# Patient Record
Sex: MALE | Race: WHITE | NOT HISPANIC OR LATINO | Employment: FULL TIME | URBAN - NONMETROPOLITAN AREA
[De-identification: names, ages, dates, MRNs, and addresses within clinical notes are randomized per-mention and may not be internally consistent; named-entity substitution may affect disease eponyms.]

---

## 2024-07-15 PROBLEM — M25.572 CHRONIC PAIN OF LEFT ANKLE: Status: ACTIVE | Noted: 2024-07-15

## 2024-07-15 PROBLEM — J34.3 HYPERTROPHY OF INFERIOR NASAL TURBINATE: Status: ACTIVE | Noted: 2024-07-15

## 2024-07-15 PROBLEM — E78.2 MIXED HYPERLIPIDEMIA: Status: ACTIVE | Noted: 2024-07-15

## 2024-07-15 PROBLEM — F17.210 CIGARETTE NICOTINE DEPENDENCE: Status: ACTIVE | Noted: 2024-07-15

## 2024-07-15 PROBLEM — J30.9 ALLERGIC RHINITIS: Status: ACTIVE | Noted: 2024-07-15

## 2024-07-15 PROBLEM — J34.2 NOSE SEPTUM DEVIATION: Status: ACTIVE | Noted: 2024-07-15

## 2024-07-15 PROBLEM — M54.9 BACK PAIN: Status: ACTIVE | Noted: 2024-07-15

## 2024-07-15 PROBLEM — G89.29 CHRONIC PAIN OF LEFT ANKLE: Status: ACTIVE | Noted: 2024-07-15

## 2024-07-15 NOTE — PROGRESS NOTES
"Patient ID:   Howard Yung is a 44 y.o. male with PMH remarkable for HLD, chronic back pain, tobacco dependence who presents to the office today for Establish Care.    HEALTH MAINTENANCE: Establish Care  Previous PCP: Dr ANN MARIE Paula  Last Office Visit: 9/2022  Last Labs: 9/22/2022. Lipid last in 2018. DUE  Colonoscopy (45-75 or age 40 with 1st degree relative dx colon ca): DUE  - reports that he is feeling ok. Does not think he snores.  - has smoked ~25 yrs <1ppd. Has tried chantix in the past but had nightmares. Has quit for a couple months with the gum.  - unsure why he is on magnesium. He thinks it was due to cramping in legs after ankle surgery in 2022 with Dr Nayak.   - working full time,  for [employer].  - murmur and c/o TORRES, will get echocardiogram    Social History     Tobacco Use    Smoking status: Some Days     Current packs/day: 0.50     Average packs/day: 0.5 packs/day for 20.0 years (10.0 ttl pk-yrs)     Types: Cigarettes    Smokeless tobacco: Never   Substance Use Topics    Alcohol use: Yes     Alcohol/week: 2.0 standard drinks of alcohol     Types: 2 Cans of beer per week    Drug use: Never     Immunization History   Administered Date(s) Administered    Moderna SARS-CoV-2 Vaccination 04/22/2021, 05/20/2021    Tdap vaccine, age 7 year and older (BOOSTRIX, ADACEL) 10/28/2011, 09/25/2019     Review of Systems   All other systems reviewed and are negative.    Allergies   Allergen Reactions    Niacin Other, Hives, Itching and Swelling      Visit Vitals  /73   Pulse 63   Resp 18   Ht 1.803 m (5' 11\")   Wt 89.6 kg (197 lb 9.6 oz)   SpO2 97%   BMI 27.56 kg/m²   Smoking Status Some Days   BSA 2.12 m²     Physical Exam  Vitals reviewed.   Constitutional:       General: He is not in acute distress.     Appearance: Normal appearance. He is not ill-appearing.   HENT:      Head: Normocephalic and atraumatic.      Right Ear: Tympanic membrane and external ear normal.      Left Ear: Tympanic " membrane and external ear normal.      Nose: Nose normal.      Mouth/Throat:      Mouth: Mucous membranes are moist.      Pharynx: Oropharynx is clear.   Eyes:      Conjunctiva/sclera: Conjunctivae normal.      Pupils: Pupils are equal, round, and reactive to light.   Cardiovascular:      Rate and Rhythm: Normal rate and regular rhythm.      Heart sounds: Murmur heard.   Pulmonary:      Effort: Pulmonary effort is normal. No respiratory distress.      Breath sounds: Rhonchi present. No wheezing.   Abdominal:      General: There is no distension.      Palpations: Abdomen is soft. There is no mass.      Tenderness: There is no abdominal tenderness.   Musculoskeletal:         General: Normal range of motion.      Cervical back: Normal range of motion and neck supple.   Skin:     General: Skin is warm and dry.   Neurological:      General: No focal deficit present.      Mental Status: He is alert and oriented to person, place, and time.      Sensory: No sensory deficit.      Motor: No weakness.      Coordination: Coordination normal.      Gait: Gait normal.   Psychiatric:         Mood and Affect: Mood normal.         Behavior: Behavior normal.       Current Outpatient Medications   Medication Instructions    buPROPion XL (WELLBUTRIN XL) 300 mg, oral, Every morning, Do not crush, chew, or split.    magnesium oxide (MAG-OX) 400 mg, oral, Daily    multivitamin tablet 1 tablet, oral, Daily    NON FORMULARY Daily, D3 125 K2 100mg     Lab Results   Component Value Date    WBC 5.0 09/22/2022    HGB 15.6 09/22/2022    HCT 46.4 09/22/2022     09/22/2022    CHOL 210 (H) 02/08/2018    TRIG 61 02/08/2018    HDL 51.0 02/08/2018    ALT 11 09/22/2022    AST 15 09/22/2022     09/22/2022    K 3.9 09/22/2022     09/22/2022    CREATININE 0.79 09/22/2022    BUN 11 09/22/2022    CO2 29 09/22/2022    TSH 1.38 09/22/2022     Problem List Items Addressed This Visit             ICD-10-CM    Cigarette nicotine dependence  F17.210     - waxing and waning with the idea of quitting  - will discuss options for successful cessation         Relevant Medications    buPROPion XL (Wellbutrin XL) 300 mg 24 hr tablet    Encounter for medical examination to establish care Z00.00     - will check lab work         Relevant Orders    CBC and Auto Differential    Comprehensive Metabolic Panel    Lipid Panel    TSH with reflex to Free T4 if abnormal    Vitamin D 25-Hydroxy,Total (for eval of Vitamin D levels)    Vitamin B12    Colonoscopy Screening; Average Risk Patient    Cardiac murmur R01.1     - will get echocardioghram         Relevant Orders    Transthoracic Echo (TTE) Complete    Dyspnea on exertion R06.09    Relevant Orders    Transthoracic Echo (TTE) Complete     Other Visit Diagnoses         Codes    Colon cancer screening     Z12.11    Relevant Orders    Colonoscopy Screening; Average Risk Patient    Lipid screening     Z13.220    Relevant Orders    Lipid Panel          --------------------  Written by Molly Kelley RN, acting as a scribe for Dr. Torre. This note accurately reflects the work and decisions made by Dr. Torre.     I, Dr. Torre, attest all medical record entries made by the scribe were under my direction and were personally dictated by me. I have reviewed the chart and agree that the record accurately reflects my performance of the history, physical exam, and assessment and plan.

## 2024-07-16 ENCOUNTER — APPOINTMENT (OUTPATIENT)
Dept: PRIMARY CARE | Facility: CLINIC | Age: 44
End: 2024-07-16
Payer: COMMERCIAL

## 2024-07-16 VITALS
OXYGEN SATURATION: 97 % | RESPIRATION RATE: 18 BRPM | DIASTOLIC BLOOD PRESSURE: 73 MMHG | HEART RATE: 63 BPM | BODY MASS INDEX: 27.66 KG/M2 | WEIGHT: 197.6 LBS | SYSTOLIC BLOOD PRESSURE: 109 MMHG | HEIGHT: 71 IN

## 2024-07-16 DIAGNOSIS — Z13.220 LIPID SCREENING: ICD-10-CM

## 2024-07-16 DIAGNOSIS — Z12.11 COLON CANCER SCREENING: ICD-10-CM

## 2024-07-16 DIAGNOSIS — R01.1 CARDIAC MURMUR: ICD-10-CM

## 2024-07-16 DIAGNOSIS — F17.219 CIGARETTE NICOTINE DEPENDENCE WITH NICOTINE-INDUCED DISORDER: ICD-10-CM

## 2024-07-16 DIAGNOSIS — R06.09 DYSPNEA ON EXERTION: ICD-10-CM

## 2024-07-16 DIAGNOSIS — Z00.00 ENCOUNTER FOR MEDICAL EXAMINATION TO ESTABLISH CARE: ICD-10-CM

## 2024-07-16 PROCEDURE — 99204 OFFICE O/P NEW MOD 45 MIN: CPT | Performed by: INTERNAL MEDICINE

## 2024-07-16 PROCEDURE — 3008F BODY MASS INDEX DOCD: CPT | Performed by: INTERNAL MEDICINE

## 2024-07-16 RX ORDER — BISMUTH SUBSALICYLATE 262 MG
1 TABLET,CHEWABLE ORAL DAILY
COMMUNITY

## 2024-07-16 RX ORDER — BUPROPION HYDROCHLORIDE 300 MG/1
300 TABLET ORAL EVERY MORNING
Qty: 90 TABLET | Refills: 0 | Status: SHIPPED | OUTPATIENT
Start: 2024-07-16

## 2024-07-16 RX ORDER — CALCIUM CARBONATE 300MG(750)
400 TABLET,CHEWABLE ORAL DAILY
COMMUNITY

## 2024-07-16 ASSESSMENT — PATIENT HEALTH QUESTIONNAIRE - PHQ9
2. FEELING DOWN, DEPRESSED OR HOPELESS: NOT AT ALL
SUM OF ALL RESPONSES TO PHQ9 QUESTIONS 1 AND 2: 0
1. LITTLE INTEREST OR PLEASURE IN DOING THINGS: NOT AT ALL

## 2024-07-16 ASSESSMENT — PAIN SCALES - GENERAL: PAINLEVEL: 0-NO PAIN

## 2024-07-16 NOTE — PATIENT INSTRUCTIONS
It was nice to meet you in the office today!  As discussed during our visit...     Change the magnesium to a 250mg daily dose for cramping.  ------------------------------------------------------------------------------  Please have your blood drawn in the next 1-2 weeks.   You need to be FASTING for 12 hours prior to blood draw.  You may have BLACK coffee, BLACK tea and/or water at any time during the fasting time.  We will contact you with the results of your blood work and any necessary adjustments to your plan of care.  If you do not hear from us within 3-5 days of having your blood drawn, please call the [place] office at [phone].     The two closest Outpatient Lab's to this office is:  [address] [place] [address]    Hours:   Monday through Friday 7:30am - 4:30pm (Closed 12:30-1pm for lunch)     Or you can go to ...  [facility]  [address]  [place] [address] [phone]    Hours:   Monday through Friday 7:30am - 4:30pm (Closed 12:30-1pm for lunch)   Saturday 8am - 12pm    Website to confirm hours and location OR look up more locations ... [phone]

## 2024-07-19 ENCOUNTER — LAB (OUTPATIENT)
Dept: LAB | Facility: LAB | Age: 44
End: 2024-07-19
Payer: COMMERCIAL

## 2024-07-19 DIAGNOSIS — Z00.00 ENCOUNTER FOR MEDICAL EXAMINATION TO ESTABLISH CARE: ICD-10-CM

## 2024-07-19 DIAGNOSIS — Z13.220 LIPID SCREENING: ICD-10-CM

## 2024-07-19 LAB
25(OH)D3 SERPL-MCNC: 55 NG/ML (ref 30–100)
ALBUMIN SERPL BCP-MCNC: 4.2 G/DL (ref 3.4–5)
ALP SERPL-CCNC: 43 U/L (ref 33–120)
ALT SERPL W P-5'-P-CCNC: 10 U/L (ref 10–52)
ANION GAP SERPL CALC-SCNC: 13 MMOL/L (ref 10–20)
AST SERPL W P-5'-P-CCNC: 15 U/L (ref 9–39)
BASOPHILS # BLD AUTO: 0.05 X10*3/UL (ref 0–0.1)
BASOPHILS NFR BLD AUTO: 1 %
BILIRUB SERPL-MCNC: 0.5 MG/DL (ref 0–1.2)
BUN SERPL-MCNC: 13 MG/DL (ref 6–23)
CALCIUM SERPL-MCNC: 8.8 MG/DL (ref 8.6–10.3)
CHLORIDE SERPL-SCNC: 103 MMOL/L (ref 98–107)
CHOLEST SERPL-MCNC: 240 MG/DL (ref 0–199)
CHOLESTEROL/HDL RATIO: 5
CO2 SERPL-SCNC: 26 MMOL/L (ref 21–32)
CREAT SERPL-MCNC: 0.78 MG/DL (ref 0.5–1.3)
EGFRCR SERPLBLD CKD-EPI 2021: >90 ML/MIN/1.73M*2
EOSINOPHIL # BLD AUTO: 0.18 X10*3/UL (ref 0–0.7)
EOSINOPHIL NFR BLD AUTO: 3.7 %
ERYTHROCYTE [DISTWIDTH] IN BLOOD BY AUTOMATED COUNT: 12.8 % (ref 11.5–14.5)
GLUCOSE SERPL-MCNC: 109 MG/DL (ref 74–99)
HCT VFR BLD AUTO: 45.4 % (ref 41–52)
HDLC SERPL-MCNC: 48.3 MG/DL
HGB BLD-MCNC: 15.1 G/DL (ref 13.5–17.5)
IMM GRANULOCYTES # BLD AUTO: 0.01 X10*3/UL (ref 0–0.7)
IMM GRANULOCYTES NFR BLD AUTO: 0.2 % (ref 0–0.9)
LDLC SERPL CALC-MCNC: 165 MG/DL
LYMPHOCYTES # BLD AUTO: 2.04 X10*3/UL (ref 1.2–4.8)
LYMPHOCYTES NFR BLD AUTO: 41.9 %
MCH RBC QN AUTO: 31.6 PG (ref 26–34)
MCHC RBC AUTO-ENTMCNC: 33.3 G/DL (ref 32–36)
MCV RBC AUTO: 95 FL (ref 80–100)
MONOCYTES # BLD AUTO: 0.57 X10*3/UL (ref 0.1–1)
MONOCYTES NFR BLD AUTO: 11.7 %
NEUTROPHILS # BLD AUTO: 2.02 X10*3/UL (ref 1.2–7.7)
NEUTROPHILS NFR BLD AUTO: 41.5 %
NON HDL CHOLESTEROL: 192 MG/DL (ref 0–149)
NRBC BLD-RTO: 0 /100 WBCS (ref 0–0)
PLATELET # BLD AUTO: 237 X10*3/UL (ref 150–450)
POTASSIUM SERPL-SCNC: 4.1 MMOL/L (ref 3.5–5.3)
PROT SERPL-MCNC: 6.5 G/DL (ref 6.4–8.2)
RBC # BLD AUTO: 4.78 X10*6/UL (ref 4.5–5.9)
SODIUM SERPL-SCNC: 138 MMOL/L (ref 136–145)
TRIGL SERPL-MCNC: 132 MG/DL (ref 0–149)
TSH SERPL-ACNC: 1.89 MIU/L (ref 0.44–3.98)
VIT B12 SERPL-MCNC: 330 PG/ML (ref 211–911)
VLDL: 26 MG/DL (ref 0–40)
WBC # BLD AUTO: 4.9 X10*3/UL (ref 4.4–11.3)

## 2024-07-19 PROCEDURE — 82607 VITAMIN B-12: CPT

## 2024-07-19 PROCEDURE — 36415 COLL VENOUS BLD VENIPUNCTURE: CPT

## 2024-07-19 PROCEDURE — 82306 VITAMIN D 25 HYDROXY: CPT

## 2024-07-22 DIAGNOSIS — E78.5 HYPERLIPIDEMIA, UNSPECIFIED HYPERLIPIDEMIA TYPE: ICD-10-CM

## 2024-07-23 ENCOUNTER — APPOINTMENT (OUTPATIENT)
Dept: CARDIOLOGY | Facility: HOSPITAL | Age: 44
End: 2024-07-23
Payer: COMMERCIAL

## 2024-07-23 RX ORDER — ATORVASTATIN CALCIUM 20 MG/1
20 TABLET, FILM COATED ORAL DAILY
Qty: 100 TABLET | Refills: 0 | Status: SHIPPED | OUTPATIENT
Start: 2024-07-23 | End: 2025-08-27

## 2024-07-24 ENCOUNTER — HOSPITAL ENCOUNTER (OUTPATIENT)
Dept: CARDIOLOGY | Facility: HOSPITAL | Age: 44
Discharge: HOME | End: 2024-07-24
Payer: COMMERCIAL

## 2024-07-24 DIAGNOSIS — R01.1 CARDIAC MURMUR: ICD-10-CM

## 2024-07-24 DIAGNOSIS — R06.09 DYSPNEA ON EXERTION: ICD-10-CM

## 2024-07-24 PROCEDURE — 93306 TTE W/DOPPLER COMPLETE: CPT | Performed by: STUDENT IN AN ORGANIZED HEALTH CARE EDUCATION/TRAINING PROGRAM

## 2024-07-24 PROCEDURE — 93306 TTE W/DOPPLER COMPLETE: CPT

## 2024-07-26 LAB
AORTIC VALVE PEAK VELOCITY: 1.2 M/S
AV PEAK GRADIENT: 5.8 MMHG
AVA (PEAK VEL): 2.67 CM2
EJECTION FRACTION APICAL 4 CHAMBER: 59.7
EJECTION FRACTION: 59 %
LEFT ATRIUM VOLUME AREA LENGTH INDEX BSA: 28.2 ML/M2
LEFT VENTRICLE INTERNAL DIMENSION DIASTOLE: 5.01 CM (ref 3.5–6)
LEFT VENTRICULAR OUTFLOW TRACT DIAMETER: 2.1 CM
MITRAL VALVE E/A RATIO: 0.73
RIGHT VENTRICLE FREE WALL PEAK S': 13.7 CM/S

## 2024-08-23 ENCOUNTER — TELEPHONE (OUTPATIENT)
Dept: GASTROENTEROLOGY | Facility: CLINIC | Age: 44
End: 2024-08-23
Payer: COMMERCIAL

## 2024-08-23 NOTE — TELEPHONE ENCOUNTER
LM for patient to call, need to reschedule appointment on 10/16 - Dr. Horta will not be in that day.

## 2024-08-26 ENCOUNTER — TELEPHONE (OUTPATIENT)
Dept: GASTROENTEROLOGY | Facility: CLINIC | Age: 44
End: 2024-08-26
Payer: COMMERCIAL

## 2024-09-25 ENCOUNTER — APPOINTMENT (OUTPATIENT)
Dept: PREADMISSION TESTING | Facility: HOSPITAL | Age: 44
End: 2024-09-25
Payer: COMMERCIAL

## 2024-10-15 ENCOUNTER — ANESTHESIA EVENT (OUTPATIENT)
Dept: GASTROENTEROLOGY | Facility: HOSPITAL | Age: 44
End: 2024-10-15
Payer: COMMERCIAL

## 2024-10-15 NOTE — ANESTHESIA PREPROCEDURE EVALUATION
Patient: Howard Yung    Procedure Information       Date/Time: 11/06/24 1000    Scheduled providers: Do Horta MD    Procedure: COLONOSCOPY    Location: [facility]            Relevant Problems   Anesthesia (within normal limits)      Cardiac   (+) Cardiac murmur   (+) Mixed hyperlipidemia      Pulmonary   (+) Dyspnea on exertion      Neuro (within normal limits)      GI (within normal limits)      /Renal (within normal limits)      Liver (within normal limits)      Endocrine (within normal limits)      Hematology (within normal limits)      Musculoskeletal (within normal limits)      HEENT (within normal limits)      ID (within normal limits)      Skin (within normal limits)      GYN (within normal limits)      ENT   (+) Allergic rhinitis      Tobacco   (+) Cigarette nicotine dependence     There were no vitals filed for this visit.    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    Personal history of diseases of the skin and subcutaneous tissue 10/28/2014    History of dermatitis    Personal history of nicotine dependence 11/25/2014    History of nicotine dependence    Personal history of other diseases of the circulatory system 07/23/2013    History of orthostatic hypotension    Personal history of other diseases of the nervous system and sense organs 09/16/2013    History of cluster headache    Personal history of other diseases of the respiratory system 03/06/2018    History of sinusitis    Personal history of other endocrine, nutritional and metabolic disease 11/25/2014    History of hypercholesterolemia    Personal history of  other infectious and parasitic diseases 02/18/2016    History of viral infection       Current Outpatient Medications:     atorvastatin (Lipitor) 20 mg tablet, Take 1 tablet (20 mg) by mouth once daily., Disp: 100 tablet, Rfl: 0    buPROPion XL (Wellbutrin XL) 300 mg 24 hr tablet, Take 1 tablet (300 mg) by mouth once daily in the morning. Do not crush, chew, or split., Disp: 90 tablet, Rfl: 0    magnesium oxide (Mag-Ox) 400 mg tablet, Take 1 tablet (400 mg) by mouth once daily., Disp: , Rfl:     multivitamin tablet, Take 1 tablet by mouth once daily., Disp: , Rfl:     NON FORMULARY, once daily. D3 125 K2 100mg, Disp: , Rfl:   Prior to Admission medications    Medication Sig Start Date End Date Taking? Authorizing Provider   atorvastatin (Lipitor) 20 mg tablet Take 1 tablet (20 mg) by mouth once daily. 7/23/24 8/27/25  Miguelito Nicolas MD   buPROPion XL (Wellbutrin XL) 300 mg 24 hr tablet Take 1 tablet (300 mg) by mouth once daily in the morning. Do not crush, chew, or split. 7/16/24   Miguelito Nicolas MD   magnesium oxide (Mag-Ox) 400 mg tablet Take 1 tablet (400 mg) by mouth once daily.    Historical Provider, MD   multivitamin tablet Take 1 tablet by mouth once daily.    Historical Provider, MD   NON FORMULARY once daily. D3 125 K2 100mg    Historical Provider, MD     Allergies   Allergen Reactions    Niacin Other, Hives, Itching and Swelling     Social History     Tobacco Use    Smoking status: Some Days     Current packs/day: 0.50     Average packs/day: 0.5 packs/day for 20.0 years (10.0 ttl pk-yrs)     Types: Cigarettes    Smokeless tobacco: Never   Substance Use Topics    Alcohol use: Yes     Alcohol/week: 2.0 standard drinks of alcohol     Types: 2 Cans of beer per week         Chemistry    Lab Results   Component Value Date/Time     07/19/2024 0726    K 4.1 07/19/2024 0726     07/19/2024 0726    CO2 26 07/19/2024 0726    BUN 13 07/19/2024 0726    CREATININE 0.78 07/19/2024 0726    Lab Results  "  Component Value Date/Time    CALCIUM 8.8 07/19/2024 0726    ALKPHOS 43 07/19/2024 0726    AST 15 07/19/2024 0726    ALT 10 07/19/2024 0726    BILITOT 0.5 07/19/2024 0726          Lab Results   Component Value Date/Time    WBC 4.9 07/19/2024 0726    HGB 15.1 07/19/2024 0726    HCT 45.4 07/19/2024 0726     07/19/2024 0726     No results found for: \"PROTIME\", \"PTT\", \"INR\"  No results found for this or any previous visit (from the past 4464 hour(s)).  No results found for this or any previous visit from the past 1095 days.   Clinical information reviewed:                 Chart reviewed.  No clearances ordered.    Echo 7/24/24-   CONCLUSIONS:   1. The left ventricular systolic function is normal, with a Patterson's biplane calculated ejection fraction of 59%.   2. Spectral Doppler shows an impaired relaxation pattern of left ventricular diastolic filling.   3. There is normal right ventricular global systolic function.    NPO Detail:  No data recorded     Physical Exam    Airway  Mallampati: I  TM distance: >3 FB  Neck ROM: full     Cardiovascular   Rhythm: regular  Rate: normal     Dental    Pulmonary    Abdominal            Anesthesia Plan    History of general anesthesia?: yes  History of complications of general anesthesia?: no    ASA 3     MAC     The patient is a current smoker.  Patient was previously instructed to abstain from smoking on day of procedure.  Patient did not smoke on day of procedure.    intravenous induction   Anesthetic plan and risks discussed with patient.    Plan discussed with CRNA.      "

## 2024-10-16 ENCOUNTER — APPOINTMENT (OUTPATIENT)
Dept: GASTROENTEROLOGY | Facility: HOSPITAL | Age: 44
End: 2024-10-16
Payer: COMMERCIAL

## 2024-10-16 ENCOUNTER — PRE-ADMISSION TESTING (OUTPATIENT)
Dept: PREADMISSION TESTING | Facility: HOSPITAL | Age: 44
End: 2024-10-16
Payer: COMMERCIAL

## 2024-10-16 ASSESSMENT — DUKE ACTIVITY SCORE INDEX (DASI)
CAN YOU DO LIGHT WORK AROUND THE HOUSE LIKE DUSTING OR WASHING DISHES: YES
CAN YOU PARTICIPATE IN STRENOUS SPORTS LIKE SWIMMING, SINGLES TENNIS, FOOTBALL, BASKETBALL, OR SKIING: YES
CAN YOU TAKE CARE OF YOURSELF (EAT, DRESS, BATHE, OR USE TOILET): YES
CAN YOU WALK A BLOCK OR TWO ON LEVEL GROUND: YES
CAN YOU PARTICIPATE IN MODERATE RECREATIONAL ACTIVITIES LIKE GOLF, BOWLING, DANCING, DOUBLES TENNIS OR THROWING A BASEBALL OR FOOTBALL: YES
DASI METS SCORE: 9.9
CAN YOU CLIMB A FLIGHT OF STAIRS OR WALK UP A HILL: YES
CAN YOU DO YARD WORK LIKE RAKING LEAVES, WEEDING OR PUSHING A MOWER: YES
CAN YOU DO MODERATE WORK AROUND THE HOUSE LIKE VACUUMING, SWEEPING FLOORS OR CARRYING GROCERIES: YES
TOTAL_SCORE: 58.2
CAN YOU RUN A SHORT DISTANCE: YES
CAN YOU HAVE SEXUAL RELATIONS: YES
CAN YOU DO HEAVY WORK AROUND THE HOUSE LIKE SCRUBBING FLOORS OR LIFTING AND MOVING HEAVY FURNITURE: YES
CAN YOU WALK INDOORS, SUCH AS AROUND YOUR HOUSE: YES

## 2024-10-16 NOTE — PREPROCEDURE INSTRUCTIONS
Medication List            Accurate as of October 16, 2024  1:00 PM. Always use your most recent med list.                magnesium oxide 400 mg tablet  Commonly known as: Mag-Ox  Additional Medication Adjustments for Surgery: Take last dose 7 days before surgery     multivitamin tablet  Additional Medication Adjustments for Surgery: Take last dose 7 days before surgery     NON FORMULARY  Additional Medication Adjustments for Surgery: Take last dose 7 days before surgery                 Call outpatient surgery the day before between 1-3 pm to get your arrival time.   [phone]    No food the day before or day of procedure, only clear liquids. Stay hydrated.     Follow prep instructions.     You can have clear liquids up to 3 hrs prior to your procedure.  NO DAIRY, NO CREAMER, NO NON DAIRY OR ALMOND, OAT, COCONUT ETC.    Please refrain from smoking THC, cigarettes or vaping prior to your procedure at least 24 hrs.     When  you arrive park in the back ER parking lot.  Come through the ER lobby and take the first elevator to second floor.   Check in on the outpatient surgery window.    Leave all valuables at home and remove all piercing's.      NO driving for 24 hrs if you have had anesthesia or sedation.   You will also need a  if you are receiving sedation.       Bring glasses so you can read what you are signing.

## 2024-11-06 ENCOUNTER — HOSPITAL ENCOUNTER (OUTPATIENT)
Dept: GASTROENTEROLOGY | Facility: HOSPITAL | Age: 44
Setting detail: OUTPATIENT SURGERY
Discharge: HOME | End: 2024-11-06
Payer: COMMERCIAL

## 2024-11-06 ENCOUNTER — ANESTHESIA (OUTPATIENT)
Dept: GASTROENTEROLOGY | Facility: HOSPITAL | Age: 44
End: 2024-11-06
Payer: COMMERCIAL

## 2024-11-06 VITALS
HEIGHT: 71 IN | SYSTOLIC BLOOD PRESSURE: 96 MMHG | OXYGEN SATURATION: 97 % | HEART RATE: 64 BPM | WEIGHT: 197 LBS | RESPIRATION RATE: 16 BRPM | DIASTOLIC BLOOD PRESSURE: 65 MMHG | BODY MASS INDEX: 27.58 KG/M2 | TEMPERATURE: 97.3 F

## 2024-11-06 DIAGNOSIS — Z12.11 COLON CANCER SCREENING: ICD-10-CM

## 2024-11-06 DIAGNOSIS — Z00.00 ENCOUNTER FOR MEDICAL EXAMINATION TO ESTABLISH CARE: ICD-10-CM

## 2024-11-06 PROCEDURE — 3700000002 HC GENERAL ANESTHESIA TIME - EACH INCREMENTAL 1 MINUTE

## 2024-11-06 PROCEDURE — 2500000005 HC RX 250 GENERAL PHARMACY W/O HCPCS

## 2024-11-06 PROCEDURE — 2500000004 HC RX 250 GENERAL PHARMACY W/ HCPCS (ALT 636 FOR OP/ED): Performed by: NURSE ANESTHETIST, CERTIFIED REGISTERED

## 2024-11-06 PROCEDURE — 7100000009 HC PHASE TWO TIME - INITIAL BASE CHARGE

## 2024-11-06 PROCEDURE — 3700000001 HC GENERAL ANESTHESIA TIME - INITIAL BASE CHARGE

## 2024-11-06 PROCEDURE — 45385 COLONOSCOPY W/LESION REMOVAL: CPT | Performed by: SURGERY

## 2024-11-06 PROCEDURE — 7100000010 HC PHASE TWO TIME - EACH INCREMENTAL 1 MINUTE

## 2024-11-06 RX ORDER — FENTANYL CITRATE 50 UG/ML
INJECTION, SOLUTION INTRAMUSCULAR; INTRAVENOUS AS NEEDED
Status: DISCONTINUED | OUTPATIENT
Start: 2024-11-06 | End: 2024-11-06

## 2024-11-06 RX ORDER — PROPOFOL 10 MG/ML
INJECTION, EMULSION INTRAVENOUS AS NEEDED
Status: DISCONTINUED | OUTPATIENT
Start: 2024-11-06 | End: 2024-11-06

## 2024-11-06 SDOH — HEALTH STABILITY: MENTAL HEALTH: CURRENT SMOKER: 1

## 2024-11-06 ASSESSMENT — PAIN SCALES - GENERAL
PAINLEVEL_OUTOF10: 0 - NO PAIN
PAIN_LEVEL: 0

## 2024-11-06 ASSESSMENT — ENCOUNTER SYMPTOMS
CHILLS: 0
WOUND: 0
ABDOMINAL PAIN: 0
BLOOD IN STOOL: 0
FEVER: 0
SHORTNESS OF BREATH: 0
MUSCULOSKELETAL NEGATIVE: 1
NEUROLOGICAL NEGATIVE: 1
FATIGUE: 0
DIARRHEA: 0
RECTAL PAIN: 0
VOMITING: 0
DIAPHORESIS: 0
CONSTIPATION: 0
NAUSEA: 0
ABDOMINAL DISTENTION: 0
ANAL BLEEDING: 0

## 2024-11-06 ASSESSMENT — PAIN - FUNCTIONAL ASSESSMENT
PAIN_FUNCTIONAL_ASSESSMENT: 0-10

## 2024-11-06 ASSESSMENT — COLUMBIA-SUICIDE SEVERITY RATING SCALE - C-SSRS
1. IN THE PAST MONTH, HAVE YOU WISHED YOU WERE DEAD OR WISHED YOU COULD GO TO SLEEP AND NOT WAKE UP?: NO
2. HAVE YOU ACTUALLY HAD ANY THOUGHTS OF KILLING YOURSELF?: NO
6. HAVE YOU EVER DONE ANYTHING, STARTED TO DO ANYTHING, OR PREPARED TO DO ANYTHING TO END YOUR LIFE?: NO

## 2024-11-06 NOTE — DISCHARGE INSTRUCTIONS
Patient Instructions after a Colonoscopy      The anesthetics, sedatives or narcotics which were given to you today will be acting in your body for the next 24 hours, so you might feel a little sleepy or groggy.  This feeling should slowly wear off. Carefully read and follow the instructions.     You received sedation today:  - Do not drive or operate any machinery or power tools of any kind.   - No alcoholic beverages today, not even beer or wine.  - Do not make any important decisions or sign any legal documents.  - No over the counter medications that contain alcohol or that may cause drowsiness.  - Do not make any important decisions or sign any legal documents.  - Make sure you have someone with you for first 24 hours.    While it is common to experience mild to moderate abdominal distention, gas, or belching after your procedure, if any of these symptoms occur following discharge from the GI Lab or within one week of having your procedure, call the [facility] to be advised whether a visit to your nearest Urgent Care or Emergency Department is indicated.  Take this paper with you if you go.     - If you develop an allergic reaction to the medications that were given during your procedure such as difficulty breathing, rash, hives, severe nausea, vomiting or lightheadedness.  - If you experience chest pain, shortness of breath, severe abdominal pain, fevers and chills.  -If you develop signs and symptoms of bleeding such as blood in your spit, if your stools turn black, tarry, or bloody  - If you have not urinated within 8 hours following your procedure.  - If your IV site becomes painful, red, inflamed, or looks infected.    If you received a biopsy/polypectomy/sphincterotomy the following instructions apply below:    __ Do not use Aspirin containing products, non-steroidal medications or anti-coagulants for one week following your procedure. (Examples of these types of medications are: Advil,  Arthrotec, Aleve, Coumadin, Ecotrin, Heparin, Ibuprofen, Indocin, Motrin, Naprosyn, Nuprin, Plavix, Vioxx, and Voltarin, or their generic forms.  This list is not all-inclusive.  Check with your physician or pharmacist before resuming medications.)   __ Eat a soft diet today.  Avoid foods that are poorly digested for the next 24 hours.  These foods would include: nuts, beans, lettuce, red meats, and fried foods. Start with liquids and advance your diet as tolerated, gradually work up to eating solids.   __ Do not have a Barium Study or Enema for one week.    Your physician recommends the additional following instructions:    -You have a contact number available for emergencies. The signs and symptoms of potential delayed complications were discussed with you. You may return to normal activities tomorrow.  -Resume your previous diet.  -Continue your present medications.   -We are waiting for your pathology results.  -Your physician has recommended a repeat colonoscopy (date to be determined after pending pathology results are reviewed) for surveillance based on pathology results.  -The findings and recommendations have been discussed with you.  -The findings and recommendations were discussed with your family.  - Please see Medication Reconciliation Form for new medication/medications prescribed.       If you experience any problems or have any questions following discharge from the GI Lab, please call:        Nurse Signature                                                                        Date___________________                                                                            Patient/Responsible Party Signature                                        Date___________________

## 2024-11-06 NOTE — ANESTHESIA POSTPROCEDURE EVALUATION
Patient: Howard Yung    Procedure Summary       Date: 11/06/24 Room / Location: [facility]    Anesthesia Start: 0821 Anesthesia Stop: 0854    Procedure: COLONOSCOPY Diagnosis:       Encounter for medical examination to establish care      Colon cancer screening    Scheduled Providers: Do Horta MD Responsible Provider: TRUONG Wright    Anesthesia Type: MAC ASA Status: 3            Anesthesia Type: MAC    Vitals Value Taken Time   BP 88/61 11/06/24 0853   Temp 36.3 °C (97.3 °F) 11/06/24 0853   Pulse 65 11/06/24 0853   Resp 14 11/06/24 0853   SpO2 97 % 11/06/24 0853       Anesthesia Post Evaluation    Patient location during evaluation: bedside  Patient participation: waiting for patient participation  Level of consciousness: awake and sedated  Pain score: 0  Pain management: adequate  Multimodal analgesia pain management approach  Airway patency: patent  Two or more strategies used to mitigate risk of obstructive sleep apnea  Cardiovascular status: acceptable  Respiratory status: acceptable  Hydration status: acceptable  Postoperative Nausea and Vomiting: none        No notable events documented.

## 2024-11-06 NOTE — H&P
History Of Present Illness  Howard Yung is a 44 y.o. male presenting with colon cancer screening. Here for colonoscopy. Referred by his PCP, Dr. Torre. This is his first ever. Bowels are regular. No BRBPR. No family history of colon cancer. No abdominal or pelvic surgeries. He is not a smoker. Denies fever, chills, SOB, CP, n/v/d.     States stool is liquid but not clear. Agreed to fleets enema.      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    Personal history of diseases of the skin and subcutaneous tissue 10/28/2014    History of dermatitis    Personal history of nicotine dependence 11/25/2014    History of nicotine dependence    Personal history of other diseases of the circulatory system 07/23/2013    History of orthostatic hypotension    Personal history of other diseases of the nervous system and sense organs 09/16/2013    History of cluster headache    Personal history of other diseases of the respiratory system 03/06/2018    History of sinusitis    Personal history of other endocrine, nutritional and metabolic disease 11/25/2014    History of hypercholesterolemia    Personal history of other infectious and parasitic diseases 02/18/2016    History of viral infection       Surgical History  Past Surgical History:   Procedure Laterality Date    FRACTURE SURGERY Left 2022    ankle    KNEE SURGERY Right 07/23/2013    Knee Surgery    OTHER SURGICAL HISTORY  09/22/2022    Vasectomy        Social History  He reports that he quit smoking about 20 years ago. His smoking use included cigarettes. He has a 10 pack-year smoking history. He has never used smokeless tobacco. He reports current alcohol use of about 2.0 standard drinks of alcohol per week. He reports that he does not use drugs.    Family History  Family  History   Problem Relation Name Age of Onset    Heart disease Maternal Grandmother          Allergies  Niacin    Current Outpatient Medications on File Prior to Encounter   Medication Sig Dispense Refill    magnesium oxide (Mag-Ox) 400 mg tablet Take 1 tablet (400 mg) by mouth once daily.      multivitamin tablet Take 1 tablet by mouth once daily.      NON FORMULARY once daily. D3 125 K2 100mg       No current facility-administered medications on file prior to encounter.       Review of Systems   Constitutional:  Negative for chills, diaphoresis, fatigue and fever.   HENT: Negative.     Respiratory:  Negative for shortness of breath.    Cardiovascular:  Negative for chest pain.   Gastrointestinal:  Negative for abdominal distention, abdominal pain, anal bleeding, blood in stool, constipation, diarrhea, nausea, rectal pain and vomiting.   Genitourinary: Negative.    Musculoskeletal: Negative.    Skin:  Negative for pallor, rash and wound.   Neurological: Negative.         Physical Exam  Constitutional:       General: He is not in acute distress.     Appearance: Normal appearance.   HENT:      Head: Normocephalic.      Mouth/Throat:      Mouth: Mucous membranes are moist.   Eyes:      General: No scleral icterus.     Conjunctiva/sclera: Conjunctivae normal.      Pupils: Pupils are equal, round, and reactive to light.   Cardiovascular:      Rate and Rhythm: Normal rate and regular rhythm.      Pulses: Normal pulses.      Heart sounds: Normal heart sounds.   Pulmonary:      Effort: Pulmonary effort is normal. No respiratory distress.      Breath sounds: Normal breath sounds.   Abdominal:      General: Abdomen is flat. Bowel sounds are normal. There is no distension.      Palpations: Abdomen is soft. There is no mass.      Tenderness: There is no abdominal tenderness. There is no guarding or rebound.   Musculoskeletal:         General: Normal range of motion.   Skin:     General: Skin is warm and dry.   Neurological:      " General: No focal deficit present.      Mental Status: He is alert and oriented to person, place, and time. Mental status is at baseline.   Psychiatric:         Mood and Affect: Mood normal.          Last Recorded Vitals  Blood pressure 109/80, pulse 62, temperature 36.2 °C (97.2 °F), temperature source Temporal, resp. rate 17, height 1.803 m (5' 11\"), weight 89.4 kg (197 lb), SpO2 98%.       Assessment/Plan   Assessment & Plan  Colon cancer screening      Colonoscopy, fleets enema beforehand       I spent 25 minutes in the professional and overall care of this patient.      Corina Manzano PA-C    "

## 2024-11-07 ASSESSMENT — PAIN SCALES - GENERAL: PAINLEVEL_OUTOF10: 0 - NO PAIN

## 2024-11-13 LAB
LABORATORY COMMENT REPORT: NORMAL
PATH REPORT.FINAL DX SPEC: NORMAL
PATH REPORT.GROSS SPEC: NORMAL
PATH REPORT.RELEVANT HX SPEC: NORMAL
PATH REPORT.TOTAL CANCER: NORMAL

## 2025-05-20 ENCOUNTER — APPOINTMENT (OUTPATIENT)
Dept: OTOLARYNGOLOGY | Facility: CLINIC | Age: 45
End: 2025-05-20
Payer: COMMERCIAL

## 2025-05-20 DIAGNOSIS — J34.2 DEVIATED NASAL SEPTUM: ICD-10-CM

## 2025-05-20 DIAGNOSIS — J30.9 ALLERGIC RHINITIS, UNSPECIFIED SEASONALITY, UNSPECIFIED TRIGGER: Primary | ICD-10-CM

## 2025-05-20 RX ORDER — AZELASTINE 1 MG/ML
2 SPRAY, METERED NASAL 2 TIMES DAILY
Qty: 30 ML | Refills: 0 | Status: SHIPPED | OUTPATIENT
Start: 2025-05-20 | End: 2026-05-20

## 2025-05-20 RX ORDER — IPRATROPIUM BROMIDE 42 UG/1
2 SPRAY, METERED NASAL 2 TIMES DAILY
Qty: 30 ML | Refills: 0 | Status: SHIPPED | OUTPATIENT
Start: 2025-05-20 | End: 2025-06-19

## 2025-05-20 NOTE — PROGRESS NOTES
05.20.2025.  ______________________________________________________________________       Patient Discussion/Summary     Patient has a deviated nasal septum to right, left inferior turbinate hypertrophy.  He has frequent sneezing, and clear nasal discharge.  He is a smoker.     Plan:  1- Allergy test  2- RF treatment to inferior turbinates vs septoplasty-turbinoplasty recommended      Chief Complaint     sinus issues      History of Present Illness  Mr. Yung is a 40 yo M. He has chronic nose sinus problems for more than 2 years. HE has nasal congestion stuffiness in the morning and in the evening. He has sinus pressure, nasal discharge (clear, milky), postnasal drip and snoring. He was given sinusitis treatment by his pcp, but could not find relief.      He was not tested for allergies recently.  He has tried oral steroids years ago, he temporarily felt good at that time.        Review of Systems     Constitutional: no fever, not feeling tired, no recent weight gain and no recent weight loss.   Eyes: no eye pain, no double vision and no itching of the eyes.   ENMT: rhinorrhea, sinus pressure, nasal blockage/obstruction, snoring and postnasal drip, but no difficulty with hearing, no hearing loss, no pain in the ear, no vertigo, no tinnitus, the ears do not feel full, no discharge from the ears, no nosebleeds, no sore throat, no hoarseness, the gums were not bleeding, no dry mouth, no dysphagia and no mouth ulcers.   Cardiovascular: no history of murmur, no chest pain, no palpitations and no lower extremity edema.   Respiratory: no shortness of breath, no dyspnea during exertion, no wheezing, not coughing up sputum and not coughing up blood.   Gastrointestinal: no heartburn, no vomiting, no change in appetite and no pain on swallowing.   Genitourinary: no dysuria and no difficulty urinating.   Musculoskeletal: no arthralgias and no myalgias.   Integumentary: no rashes, no skin lesions, no itching, no dry skin and no  unusual growth on the skin.   Neurological: no fainting, no headache, no numbness, no convulsions and no weakness.   Psychiatric: no anxiety and no depression.   Endocrine: no increased thirst.   Hematologic/Lymphatic: no swollen glands, no tendency for easy bleeding and no tendency for easy bruising.   All other systems have been reviewed and are negative for complaint.          Physical Exam  General appearance: Healthy-appearing, well-nourished, well groomed, in no acute distress.      Head and Face: Atraumatic with no masses, lesions, or scarring.      Salivary glands: No tenderness of the parotid glands or parotid masses.      No tenderness of the submandibular glands or submandibular masses.      Facial strength: Normal strength and symmetry, no synkinesis or facial tic.      Eyes: Conjunctivas look non-hyperemic bilaterally     Ears: Bilaterally ear canals look normal. Tympanic membranes look intact, no hyperemia, fluid or retraction. Hearing grossly normal.      Nose: Mucosa looks normal. No purulent discharge.      Oral Cavity/Mouth: Lips and tongue look normal.      Throat: No postnasal discharge. Tonsillectomy. No hyperemia.     Neck: Symmetrical, trachea midline.      Pulmonary: Normal respiratory effort.      Lymphatic No palpable pathologic lymph nodes at neck.      Neurological/Psychiatric Orientation to person, place, and time: Normal.   Mood and affect: Normal.      Extremities: No clubbing.      Skin: No skin lesions were noted at face or neck        Procedure  NASAL ENDOSCOPY:  0 degree nasal endoscope was advanced through patient's nasal cavities. On examination patient's septum was deviated to right markedly, touching right middle turbinates. Minimal mucopurulent secretions were observed in right nasal cavity. No polyps on both sides. Left inferior turbinate has compensatory hypertrophy.              Signatures   Electronically signed by : Mirian Lowery MD; Jan 28 2020  8:40AM EST (Author)

## 2025-05-20 NOTE — PROGRESS NOTES
Chief Complaint   sinus issues      History of Present Illness    05.20.2025: He still has difficult with breathing out his nose. Mucus in his nose.  Congestion in the morning. Sneezes a lot.    On examination, nasal septum is deviated to left anteriorly and to right posteriorly. Clear mucoid discharge.    Plan  1- azelastine and ipratropium sprays  2- follow up in one month  _________________________________________________________________    01.28.2020 Mr. Yung is a 40 yo M. He has chronic nose sinus problems for more than 2 years. He has nasal congestion stuffiness in the morning and in the evening. He has sinus pressure, nasal discharge (clear, milky), postnasal drip and snoring. He was given sinusitis treatment by his pcp, but could not find relief.      He was not tested for allergies recently.  He has tried oral steroids years ago, he temporarily felt good at that time.        Review of Systems     Constitutional: no fever, not feeling tired, no recent weight gain and no recent weight loss.   Eyes: no eye pain, no double vision and no itching of the eyes.   ENMT: rhinorrhea, sinus pressure, nasal blockage/obstruction, snoring and postnasal drip, but no difficulty with hearing, no hearing loss, no pain in the ear, no vertigo, no tinnitus, the ears do not feel full, no discharge from the ears, no nosebleeds, no sore throat, no hoarseness, the gums were not bleeding, no dry mouth, no dysphagia and no mouth ulcers.   Cardiovascular: no history of murmur, no chest pain, no palpitations and no lower extremity edema.   Respiratory: no shortness of breath, no dyspnea during exertion, no wheezing, not coughing up sputum and not coughing up blood.   Gastrointestinal: no heartburn, no vomiting, no change in appetite and no pain on swallowing.   Genitourinary: no dysuria and no difficulty urinating.   Musculoskeletal: no arthralgias and no myalgias.   Integumentary: no rashes, no skin lesions, no itching, no dry skin  and no unusual growth on the skin.   Neurological: no fainting, no headache, no numbness, no convulsions and no weakness.   Psychiatric: no anxiety and no depression.   Endocrine: no increased thirst.   Hematologic/Lymphatic: no swollen glands, no tendency for easy bleeding and no tendency for easy bruising.   All other systems have been reviewed and are negative for complaint.      Active Problems     · Arthritis of knee, left (716.96) (M17.12)   · Back pain (724.5) (M54.9)   · Bulging disc (722.2)   · Cigarette nicotine dependence (305.1) (F17.210)   · Low back strain, initial encounter (847.2) (S39.012A)   · Mixed hyperlipidemia (272.2) (E78.2)   · Refused influenza vaccine (V64.06) (Z28.21)     Past Medical History     · History of Arthropathy Of The Knee / Patella / Tibia / Fibula (716.96)   · Added by Problem List Migration; 2013-3-24   · History of Diarrhea of presumed infectious origin (009.3) (R19.7)   · Resolved Date: 18 Feb 2016   · History of cluster headache (V12.49) (Z86.69)   · Resolved Date: 18 Feb 2016   · History of dermatitis (V13.3) (Z87.2)   · Resolved Date: 18 Feb 2016   · History of hypercholesterolemia (V12.29) (Z86.39)   · Resolved Date: 18 Feb 2016   · History of nicotine dependence (V15.82) (Z87.891)   · Resolved Date: 18 Feb 2016   · History of orthostatic hypotension (V12.59) (Z86.79)   · Resolved Date: 18 Feb 2016   · History of sinusitis (V12.69) (Z87.09)   · History of viral infection (V12.09) (Z86.19)   · Resolved Date: 10 Mar 2016     Surgical History     · History of Knee Surgery     Family History     · Family history of No Significant Family History     Social History     · Alcohol Use (History)   · Caffeine Use   · Cigarette nicotine dependence (305.1) (F17.210)   · Current every day smoker (305.1) (F17.200)   · Marital History - Currently    · Occupation:     Allergies     · Niacin TABS   turns red; Recorded By: Jocelyne Lee; 7/23/2013 1:32:51 PM     Current  Meds     Medication Name Instruction   No Reported Medications        Physical Exam  General appearance: Healthy-appearing, well-nourished, well groomed, in no acute distress.      Head and Face: Atraumatic with no masses, lesions, or scarring.      Salivary glands: No tenderness of the parotid glands or parotid masses.      No tenderness of the submandibular glands or submandibular masses.      Facial strength: Normal strength and symmetry, no synkinesis or facial tic.      Eyes: Conjunctivas look non-hyperemic bilaterally     Ears: Bilaterally ear canals look normal. Tympanic membranes look intact, no hyperemia, fluid or retraction. Hearing grossly normal.      Nose: Mucosa looks normal. No purulent discharge.      Oral Cavity/Mouth: Lips and tongue look normal.      Throat: No postnasal discharge. Tonsillectomy. No hyperemia.     Neck: Symmetrical, trachea midline.      Pulmonary: Normal respiratory effort.      Lymphatic No palpable pathologic lymph nodes at neck.      Neurological/Psychiatric Orientation to person, place, and time: Normal.   Mood and affect: Normal.      Extremities: No clubbing.      Skin: No skin lesions were noted at face or neck        Procedure  NASAL ENDOSCOPY 05.20.2025  0 degree nasal endoscope was advanced through patient's nasal cavities. On examination patient's septum was deviated to right markedly, touching right inferior and middle turbinates. No polyps on both sides. Clear mucoid discharge (+). No nasopharyngeal mass        Diagnoses/Problems     · Nose septum deviation (470) (J34.2)   · Hypertrophy of inferior nasal turbinate (478.0) (J34.3)   · Allergic rhinitis (477.9) (J30.9)     Respiratory Allergy Profile IgE  Order: [id number]   Status: Final result    Test Result Released: Yes (not seen)    0 Result Notes       Component  Ref Range & Units 5 yr ago  (1/28/20) 5 yr ago  (1/28/20)   Immunocap IgE  0.0 - 214.0 KU/L 13.5    Comment: Note new reference range as of 01/13/2020.    Note:  Omalizumab (Xolair, Genentech; humanized   IgG1 antihuman IgE Fc) treatment does not   significantly interfere with the accuracy of   total IgE on the ImmunoCAP (QMCODES) platform.   J Allergy Clin Immunol 2006;117:759-66).   Allergens, parasitic diseases, smoking, and   alcohol consumption have been reported to   increase levels of total IgE in serum.   [place] Grass IgE  <0.35 KU/L <0.35    Comment:   SEE IMMUNOCAP INTERP.IGE   [place] Grass IgE  <0.35 KU/L <0.35    Comment:   SEE IMMUNOCAP INTERP.IGE   [place] Grass, [place] Blue IgE  <0.35 KU/L <0.35    Comment:   SEE IMMUNOCAP INTERP.IGE   [place] Grass IgE  <0.35 KU/L <0.35    Comment:   SEE IMMUNOCAP INTERP.IGE   [place], [place] Quarters IgE  <0.35 KU/L <0.35    Comment:   SEE IMMUNOCAP INTERP.IGE   Common Pigweed IgE  <0.35 KU/L <0.35    Comment:   SEE IMMUNOCAP INTERP.IGE   Common Ragweed IgE  <0.35 KU/L <0.35    Comment:   SEE IMMUNOCAP INTERP.IGE   White Albino IgE  <0.35 KU/L <0.35    Comment:   SEE IMMUNOCAP INTERP.IGE   Common Silver Birch IgE  <0.35 KU/L <0.35    Comment:   SEE IMMUNOCAP INTERP.IGE   Box-Elder IgE  <0.35 KU/L <0.35    Comment:   SEE IMMUNOCAP INTERP.IGE   [place] Juniper IgE  <0.35 KU/L <0.35    Comment:   SEE IMMUNOCAP INTERP.IGE   [place] IgE  <0.35 KU/L <0.35    Comment:   SEE IMMUNOCAP INTERP.IGE   Elm IgE  <0.35 KU/L <0.35    Comment:   SEE IMMUNOCAP INTERP.IGE   [place] IgE  <0.35 KU/L <0.35    Comment:   SEE IMMUNOCAP INTERP.IGE   Oak IgE  <0.35 KU/L <0.35    Comment:   SEE IMMUNOCAP INTERP.IGE   Pecan, [place] IgE  <0.35 KU/L <0.35    Comment:   SEE IMMUNOCAP INTERP.IGE   [place], [place] IgE  <0.35 KU/L <0.35    Comment:   SEE IMMUNOCAP INTERP.IGE   [place] Tree IgE  <0.35 KU/L <0.35 <0.35 CM   Comment:   SEE IMMUNOCAP INTERP.IGE   [place] Saltwort/Russian Thistle IgE  <0.35 KU/L <0.35    Comment:   SEE IMMUNOCAP INTERP.IGE   Sheep Sorrel IgE  <0.35 KU/L <0.35    Comment:   SEE IMMUNOCAP INTERP.IGE    Cat Dander IgE  <0.35 KU/L <0.35    Comment:   SEE IMMUNOCAP INTERP.IGE   Dog Dander IgE  <0.35 KU/L <0.35    Comment:   SEE IMMUNOCAP INTERP.IGE   Alternaria Alternata IgE  <0.35 KU/L <0.35    Comment:   SEE IMMUNOCAP INTERP.IGE   Aspergillus Fumigatus IgE  <0.35 KU/L <0.35    Comment:   SEE IMMUNOCAP INTERP.IGE   Cladosporium Herbarum IgE  <0.35 KU/L <0.35    Comment:   SEE IMMUNOCAP INTERP.IGE   Penicillium Chrysogenum (P. notatum) IgE  <0.35 KU/L <0.35    Comment:   SEE IMMUNOCAP INTERP.IGE   Plantain IgE  <0.35 KU/L <0.35    Comment:   SEE IMMUNOCAP INTERP.IGE   Dust Mite (D. farinae) IgE  <0.35 KU/L <0.35    Comment:   SEE IMMUNOCAP INTERP.IGE   Dust Mite (D. pteronyssinus) IgE  <0.35 KU/L <0.35    Comment:   SEE IMMUNOCAP INTERP.IGE   Irish Cockroach IgE  <0.35 KU/L <0.35    Comment:   SEE IMMUNOCAP INTERP.IGE   Immunocap Interpretation SEE COMMENT SEE COMMENT CM   Comment:      REFERENCE RANGE (IMMUNOCAP) IGE   KU/L           CLASS     INTERPRETATION       <  0.35       0       BELOW DETECTION   0.35-  0.69       1       LOW POSITIVE   0.70-  3.49       2       MODERATE POSITIVE   3.50- 17.49       3       HIGH POSITIVE  17.50- 49          4       VERY HIGH POSITIVE  50   - 99          5       VERY HIGH POSITIVE       >100          6       VERY HIGH POSITIVE   Resulting Agency [facility]        Food Allergy Profile IgE  Order: [id number]   Status: Final result    Test Result Released: Yes (not seen)    0 Result Notes       Component  Ref Range & Units 5 yr ago  (1/28/20) 5 yr ago  (1/28/20)   Clam IgE  <0.35 KU/L <0.35    Comment:   SEE IMMUNOCAP INTERP.IGE   Fish (Cod) IgE  <0.35 KU/L <0.35    Comment:   SEE IMMUNOCAP INTERP.IGE   [place], Corn IgE  <0.35 KU/L <0.35    Comment:   SEE IMMUNOCAP INTERP.IGE   Egg White IgE  <0.35 KU/L <0.35    Comment:   SEE IMMUNOCAP INTERP.IGE   Milk IgE  <0.35 KU/L <0.35    Comment:   SEE IMMUNOCAP INTERP.IGE   Peanut IgE  <0.35 KU/L <0.35    Comment:   SEE IMMUNOCAP  INTERP.IGE   Scallop IgE  <0.35 KU/L <0.35    Comment:   SEE IMMUNOCAP INTERP.IGE   Sesame Seed IgE  <0.35 KU/L <0.35    Comment:   SEE IMMUNOCAP INTERP.IGE   Shrimp IgE  <0.35 KU/L <0.35    Comment:   SEE IMMUNOCAP INTERP.IGE   Soybean IgE  <0.35 KU/L <0.35    Comment:   SEE IMMUNOCAP INTERP.IGE   [place] IgE  <0.35 KU/L <0.35 <0.35 CM   Comment:   SEE IMMUNOCAP INTERP.IGE   Wheat IgE  <0.35 KU/L <0.35    Comment:   SEE IMMUNOCAP INTERP.IGE   Immunocap Interpretation SEE COMMENT SEE COMMENT CM   Comment:      REFERENCE RANGE (IMMUNOCAP) IGE   KU/L           CLASS     INTERPRETATION       <  0.35       0       BELOW DETECTION   0.35-  0.69       1       LOW POSITIVE   0.70-  3.49       2       MODERATE POSITIVE   3.50- 17.49       3       HIGH POSITIVE  17.50- 49          4       VERY HIGH POSITIVE  50   - 99          5       VERY HIGH POSITIVE       >100          6       VERY HIGH POSITIVE   Resulting Agency [facility]        Specimen Collected: 01/28/20 08:52 Last Resulted: 01/29/20 14:15        Patient Discussion/Summary     05.20.2025: He still has difficult with breathing out his nose. Mucus in his nose.  Congestion in the morning. Sneezes a lot.    On examination, nasal septum is deviated to left anteriorly and to right posteriorly. Clear mucoid discharge.    Plan  1- azelastine and ipratropium sprays  2- follow up in one month  _________________________________________________________________    01.28.2020 Patient has a deviated nasal septum to right, left inferior turbinate hypertrophy.  He has frequent sneezing, and clear nasal discharge.  He is a smoker.     Plan:  1- Allergy test  2- RF treatment to inferior turbinates vs septoplasty-turbinoplasty recommended

## 2025-06-16 ENCOUNTER — APPOINTMENT (OUTPATIENT)
Dept: OTOLARYNGOLOGY | Facility: CLINIC | Age: 45
End: 2025-06-16
Payer: COMMERCIAL

## 2025-06-30 ENCOUNTER — APPOINTMENT (OUTPATIENT)
Dept: OTOLARYNGOLOGY | Facility: CLINIC | Age: 45
End: 2025-06-30
Payer: COMMERCIAL

## 2025-06-30 ENCOUNTER — OFFICE VISIT (OUTPATIENT)
Dept: OTOLARYNGOLOGY | Facility: CLINIC | Age: 45
End: 2025-06-30
Payer: COMMERCIAL

## 2025-06-30 DIAGNOSIS — J30.9 ALLERGIC RHINITIS, UNSPECIFIED SEASONALITY, UNSPECIFIED TRIGGER: ICD-10-CM

## 2025-06-30 PROCEDURE — 1036F TOBACCO NON-USER: CPT | Performed by: OTOLARYNGOLOGY

## 2025-06-30 PROCEDURE — 99213 OFFICE O/P EST LOW 20 MIN: CPT | Performed by: OTOLARYNGOLOGY

## 2025-06-30 RX ORDER — IPRATROPIUM BROMIDE 42 UG/1
SPRAY, METERED NASAL
Qty: 30 ML | Refills: 11 | Status: SHIPPED | OUTPATIENT
Start: 2025-06-30

## 2025-06-30 RX ORDER — AZELASTINE 1 MG/ML
2 SPRAY, METERED NASAL 2 TIMES DAILY
Qty: 30 ML | Refills: 11 | Status: SHIPPED | OUTPATIENT
Start: 2025-06-30 | End: 2026-06-30

## 2025-06-30 NOTE — PROGRESS NOTES
Chief Complaint   sinus issues      History of Present Illness    06.30.2025. Sneezing has improved almost 100%. Mucus is almost gone.     Plan:  1- continue sprays   2- follow up in one year  ______________________________________________________________________    05.20.2025: He still has difficult with breathing out his nose. Mucus in his nose.  Congestion in the morning. Sneezes a lot.    On examination, nasal septum is deviated to left anteriorly and to right posteriorly. Clear mucoid discharge.    Plan  1- azelastine and ipratropium sprays  2- follow up in one month  _________________________________________________________________    01.28.2020 Mr. Yung is a 40 yo M. He has chronic nose sinus problems for more than 2 years. He has nasal congestion stuffiness in the morning and in the evening. He has sinus pressure, nasal discharge (clear, milky), postnasal drip and snoring. He was given sinusitis treatment by his pcp, but could not find relief.      He was not tested for allergies recently.  He has tried oral steroids years ago, he temporarily felt good at that time.        Review of Systems     Constitutional: no fever, not feeling tired, no recent weight gain and no recent weight loss.   Eyes: no eye pain, no double vision and no itching of the eyes.   ENMT: rhinorrhea, sinus pressure, nasal blockage/obstruction, snoring and postnasal drip, but no difficulty with hearing, no hearing loss, no pain in the ear, no vertigo, no tinnitus, the ears do not feel full, no discharge from the ears, no nosebleeds, no sore throat, no hoarseness, the gums were not bleeding, no dry mouth, no dysphagia and no mouth ulcers.   Cardiovascular: no history of murmur, no chest pain, no palpitations and no lower extremity edema.   Respiratory: no shortness of breath, no dyspnea during exertion, no wheezing, not coughing up sputum and not coughing up blood.   Gastrointestinal: no heartburn, no vomiting, no change in appetite and  no pain on swallowing.   Genitourinary: no dysuria and no difficulty urinating.   Musculoskeletal: no arthralgias and no myalgias.   Integumentary: no rashes, no skin lesions, no itching, no dry skin and no unusual growth on the skin.   Neurological: no fainting, no headache, no numbness, no convulsions and no weakness.   Psychiatric: no anxiety and no depression.   Endocrine: no increased thirst.   Hematologic/Lymphatic: no swollen glands, no tendency for easy bleeding and no tendency for easy bruising.   All other systems have been reviewed and are negative for complaint.      Active Problems     · Arthritis of knee, left (716.96) (M17.12)   · Back pain (724.5) (M54.9)   · Bulging disc (722.2)   · Cigarette nicotine dependence (305.1) (F17.210)   · Low back strain, initial encounter (847.2) (S39.012A)   · Mixed hyperlipidemia (272.2) (E78.2)   · Refused influenza vaccine (V64.06) (Z28.21)     Past Medical History     · History of Arthropathy Of The Knee / Patella / Tibia / Fibula (716.96)   · Added by Problem List Migration; 2013-3-24   · History of Diarrhea of presumed infectious origin (009.3) (R19.7)   · Resolved Date: 18 Feb 2016   · History of cluster headache (V12.49) (Z86.69)   · Resolved Date: 18 Feb 2016   · History of dermatitis (V13.3) (Z87.2)   · Resolved Date: 18 Feb 2016   · History of hypercholesterolemia (V12.29) (Z86.39)   · Resolved Date: 18 Feb 2016   · History of nicotine dependence (V15.82) (Z87.891)   · Resolved Date: 18 Feb 2016   · History of orthostatic hypotension (V12.59) (Z86.79)   · Resolved Date: 18 Feb 2016   · History of sinusitis (V12.69) (Z87.09)   · History of viral infection (V12.09) (Z86.19)   · Resolved Date: 10 Mar 2016     Surgical History     · History of Knee Surgery     Family History     · Family history of No Significant Family History     Social History     · Alcohol Use (History)   · Caffeine Use   · Cigarette nicotine dependence (305.1) (F17.210)   · Current every  day smoker (305.1) (F17.200)   · Marital History - Currently    · Occupation:     Allergies     · Niacin TABS   turns red; Recorded By: Jocelyne Lee; 7/23/2013 1:32:51 PM     Current Meds     Medication Name Instruction   No Reported Medications        Physical Exam  General appearance: Healthy-appearing, well-nourished, well groomed, in no acute distress.      Head and Face: Atraumatic with no masses, lesions, or scarring.      Salivary glands: No tenderness of the parotid glands or parotid masses.      No tenderness of the submandibular glands or submandibular masses.      Facial strength: Normal strength and symmetry, no synkinesis or facial tic.      Eyes: Conjunctivas look non-hyperemic bilaterally     Ears: Bilaterally ear canals look normal. Tympanic membranes look intact, no hyperemia, fluid or retraction. Hearing grossly normal.      Nose: Mucosa looks normal. No purulent discharge.      Oral Cavity/Mouth: Lips and tongue look normal.      Throat: No postnasal discharge. Tonsillectomy. No hyperemia.     Neck: Symmetrical, trachea midline.      Pulmonary: Normal respiratory effort.      Lymphatic No palpable pathologic lymph nodes at neck.      Neurological/Psychiatric Orientation to person, place, and time: Normal.   Mood and affect: Normal.      Extremities: No clubbing.      Skin: No skin lesions were noted at face or neck        Procedure  NASAL ENDOSCOPY 05.20.2025  0 degree nasal endoscope was advanced through patient's nasal cavities. On examination patient's septum was deviated to right markedly, touching right inferior and middle turbinates. No polyps on both sides. Clear mucoid discharge (+). No nasopharyngeal mass        Diagnoses/Problems     · Nose septum deviation (470) (J34.2)   · Hypertrophy of inferior nasal turbinate (478.0) (J34.3)   · Allergic rhinitis (477.9) (J30.9)     Respiratory Allergy Profile IgE  Order: [id number]   Status: Final result    Test Result Released: Yes  (not seen)    0 Result Notes       Component  Ref Range & Units 5 yr ago  (1/28/20) 5 yr ago  (1/28/20)   Immunocap IgE  0.0 - 214.0 KU/L 13.5    Comment: Note new reference range as of 01/13/2020.   Note:  Omalizumab (Xolair, [employer]; humanized   IgG1 antihuman IgE Fc) treatment does not   significantly interfere with the accuracy of   total IgE on the ImmunoCAP ([employer]) platform.   J Allergy Clin Immunol 2006;117:759-66).   Allergens, parasitic diseases, smoking, and   alcohol consumption have been reported to   increase levels of total IgE in serum.   [place] Grass IgE  <0.35 KU/L <0.35    Comment:   SEE IMMUNOCAP INTERP.IGE   [place] Grass IgE  <0.35 KU/L <0.35    Comment:   SEE IMMUNOCAP INTERP.IGE   [place] Grass, [place] Blue IgE  <0.35 KU/L <0.35    Comment:   SEE IMMUNOCAP INTERP.IGE   [place] Grass IgE  <0.35 KU/L <0.35    Comment:   SEE IMMUNOCAP INTERP.IGE   [place], Lamb's Quarters IgE  <0.35 KU/L <0.35    Comment:   SEE IMMUNOCAP INTERP.IGE   Common Pigweed IgE  <0.35 KU/L <0.35    Comment:   SEE IMMUNOCAP INTERP.IGE   Common Ragweed IgE  <0.35 KU/L <0.35    Comment:   SEE IMMUNOCAP INTERP.IGE   White Albino IgE  <0.35 KU/L <0.35    Comment:   SEE IMMUNOCAP INTERP.IGE   Common Silver Birch IgE  <0.35 KU/L <0.35    Comment:   SEE IMMUNOCAP INTERP.IGE   Box-Elder IgE  <0.35 KU/L <0.35    Comment:   SEE IMMUNOCAP INTERP.IGE   [place] Juniper IgE  <0.35 KU/L <0.35    Comment:   SEE IMMUNOCAP INTERP.IGE   [place] IgE  <0.35 KU/L <0.35    Comment:   SEE IMMUNOCAP INTERP.IGE   Elm IgE  <0.35 KU/L <0.35    Comment:   SEE IMMUNOCAP INTERP.IGE   [place] IgE  <0.35 KU/L <0.35    Comment:   SEE IMMUNOCAP INTERP.IGE   Oak IgE  <0.35 KU/L <0.35    Comment:   SEE IMMUNOCAP INTERP.IGE   Pecan, [place]ory IgE  <0.35 KU/L <0.35    Comment:   SEE IMMUNOCAP INTERP.IGE   [place]le [place], [place] IgE  <0.35 KU/L <0.35    Comment:   SEE IMMUNOCAP INTERP.IGE   Bear Mountain Tree IgE  <0.35 KU/L <0.35 <0.35 CM   Comment:    SEE IMMUNOCAP INTERP.IGE   Prickly Saltwort/Russian Thistle IgE  <0.35 KU/L <0.35    Comment:   SEE IMMUNOCAP INTERP.IGE   Sheep Sorrel IgE  <0.35 KU/L <0.35    Comment:   SEE IMMUNOCAP INTERP.IGE   Cat Dander IgE  <0.35 KU/L <0.35    Comment:   SEE IMMUNOCAP INTERP.IGE   Dog Dander IgE  <0.35 KU/L <0.35    Comment:   SEE IMMUNOCAP INTERP.IGE   Alternaria Alternata IgE  <0.35 KU/L <0.35    Comment:   SEE IMMUNOCAP INTERP.IGE   Aspergillus Fumigatus IgE  <0.35 KU/L <0.35    Comment:   SEE IMMUNOCAP INTERP.IGE   Cladosporium Herbarum IgE  <0.35 KU/L <0.35    Comment:   SEE IMMUNOCAP INTERP.IGE   Penicillium Chrysogenum (P. notatum) IgE  <0.35 KU/L <0.35    Comment:   SEE IMMUNOCAP INTERP.IGE   Plantain IgE  <0.35 KU/L <0.35    Comment:   SEE IMMUNOCAP INTERP.IGE   Dust Mite (D. farinae) IgE  <0.35 KU/L <0.35    Comment:   SEE IMMUNOCAP INTERP.IGE   Dust Mite (D. pteronyssinus) IgE  <0.35 KU/L <0.35    Comment:   SEE IMMUNOCAP INTERP.IGE   [place] Cockroach IgE  <0.35 KU/L <0.35    Comment:   SEE IMMUNOCAP INTERP.IGE   Immunocap Interpretation SEE COMMENT SEE COMMENT CM   Comment:      REFERENCE RANGE (IMMUNOCAP) IGE   KU/L           CLASS     INTERPRETATION       <  0.35       0       BELOW DETECTION   0.35-  0.69       1       LOW POSITIVE   0.70-  3.49       2       MODERATE POSITIVE   3.50- 17.49       3       HIGH POSITIVE  17.50- 49          4       VERY HIGH POSITIVE  50   - 99          5       VERY HIGH POSITIVE       >100          6       VERY HIGH POSITIVE   Resulting Agency [facility]        Food Allergy Profile IgE  Order: [id number]   Status: Final result    Test Result Released: Yes (not seen)    0 Result Notes       Component  Ref Range & Units 5 yr ago  (1/28/20) 5 yr ago  (1/28/20)   Clam IgE  <0.35 KU/L <0.35    Comment:   SEE IMMUNOCAP INTERP.IGE   Fish (Cod) IgE  <0.35 KU/L <0.35    Comment:   SEE IMMUNOCAP INTERP.IGE   [place], Corn IgE  <0.35 KU/L <0.35    Comment:   SEE IMMUNOCAP INTERP.IGE   Egg  White IgE  <0.35 KU/L <0.35    Comment:   SEE IMMUNOCAP INTERP.IGE   Milk IgE  <0.35 KU/L <0.35    Comment:   SEE IMMUNOCAP INTERP.IGE   Peanut IgE  <0.35 KU/L <0.35    Comment:   SEE IMMUNOCAP INTERP.IGE   Scallop IgE  <0.35 KU/L <0.35    Comment:   SEE IMMUNOCAP INTERP.IGE   Sesame Seed IgE  <0.35 KU/L <0.35    Comment:   SEE IMMUNOCAP INTERP.IGE   Shrimp IgE  <0.35 KU/L <0.35    Comment:   SEE IMMUNOCAP INTERP.IGE   Soybean IgE  <0.35 KU/L <0.35    Comment:   SEE IMMUNOCAP INTERP.IGE   Polk IgE  <0.35 KU/L <0.35 <0.35 CM   Comment:   SEE IMMUNOCAP INTERP.IGE   Wheat IgE  <0.35 KU/L <0.35    Comment:   SEE IMMUNOCAP INTERP.IGE   Immunocap Interpretation SEE COMMENT SEE COMMENT CM   Comment:      REFERENCE RANGE (IMMUNOCAP) IGE   KU/L           CLASS     INTERPRETATION       <  0.35       0       BELOW DETECTION   0.35-  0.69       1       LOW POSITIVE   0.70-  3.49       2       MODERATE POSITIVE   3.50- 17.49       3       HIGH POSITIVE  17.50- 49          4       VERY HIGH POSITIVE  50   - 99          5       VERY HIGH POSITIVE       >100          6       VERY HIGH POSITIVE   Resulting Agency [facility]        Specimen Collected: 01/28/20 08:52 Last Resulted: 01/29/20 14:15        Patient Discussion/Summary     05.20.2025: He still has difficulty. with breathing out his nose. Mucus in his nose.  Congestion in the morning. Sneezes a lot.    On examination, nasal septum is deviated to left anteriorly and to right posteriorly. Clear mucoid discharge.    Plan  1- azelastine and ipratropium sprays  2- follow up in one month  _________________________________________________________________    01.28.2020 Patient has a deviated nasal septum to right, left inferior turbinate hypertrophy.  He has frequent sneezing, and clear nasal discharge.  He is a smoker.     Plan:  1- Allergy test  2- RF treatment to inferior turbinates vs septoplasty-turbinoplasty recommended

## 2025-09-29 ENCOUNTER — APPOINTMENT (OUTPATIENT)
Dept: ALLERGY | Facility: CLINIC | Age: 45
End: 2025-09-29
Payer: COMMERCIAL

## 2026-06-29 ENCOUNTER — APPOINTMENT (OUTPATIENT)
Dept: OTOLARYNGOLOGY | Facility: CLINIC | Age: 46
End: 2026-06-29
Payer: COMMERCIAL